# Patient Record
Sex: FEMALE | Race: WHITE | NOT HISPANIC OR LATINO | Employment: FULL TIME | URBAN - METROPOLITAN AREA
[De-identification: names, ages, dates, MRNs, and addresses within clinical notes are randomized per-mention and may not be internally consistent; named-entity substitution may affect disease eponyms.]

---

## 2020-03-05 ENCOUNTER — HOSPITAL ENCOUNTER (EMERGENCY)
Facility: HOSPITAL | Age: 25
Discharge: HOME | End: 2020-03-06
Attending: EMERGENCY MEDICINE

## 2020-03-05 DIAGNOSIS — R52 PAIN: ICD-10-CM

## 2020-03-05 DIAGNOSIS — S82.842A CLOSED BIMALLEOLAR FRACTURE OF LEFT ANKLE, INITIAL ENCOUNTER: Primary | ICD-10-CM

## 2020-03-05 PROCEDURE — 99284 EMERGENCY DEPT VISIT MOD MDM: CPT | Mod: 25

## 2020-03-05 PROCEDURE — 29515 APPLICATION SHORT LEG SPLINT: CPT | Mod: LT

## 2020-03-06 VITALS
HEART RATE: 92 BPM | OXYGEN SATURATION: 100 % | HEIGHT: 68 IN | DIASTOLIC BLOOD PRESSURE: 59 MMHG | RESPIRATION RATE: 16 BRPM | TEMPERATURE: 100 F | BODY MASS INDEX: 21.98 KG/M2 | SYSTOLIC BLOOD PRESSURE: 120 MMHG | WEIGHT: 145 LBS

## 2020-03-06 LAB
B-HCG UR QL: NEGATIVE
CTP QC/QA: YES

## 2020-03-06 PROCEDURE — 25000003 PHARM REV CODE 250: Performed by: EMERGENCY MEDICINE

## 2020-03-06 PROCEDURE — 81025 URINE PREGNANCY TEST: CPT | Performed by: EMERGENCY MEDICINE

## 2020-03-06 RX ORDER — NAPROXEN 500 MG/1
500 TABLET ORAL 2 TIMES DAILY WITH MEALS
Qty: 30 TABLET | Refills: 0 | Status: SHIPPED | OUTPATIENT
Start: 2020-03-06

## 2020-03-06 RX ORDER — HYDROCODONE BITARTRATE AND ACETAMINOPHEN 5; 325 MG/1; MG/1
1 TABLET ORAL
Status: DISCONTINUED | OUTPATIENT
Start: 2020-03-06 | End: 2020-03-06 | Stop reason: HOSPADM

## 2020-03-06 RX ORDER — HYDROCODONE BITARTRATE AND ACETAMINOPHEN 5; 325 MG/1; MG/1
1 TABLET ORAL EVERY 8 HOURS PRN
Qty: 12 TABLET | Refills: 0 | Status: SHIPPED | OUTPATIENT
Start: 2020-03-06

## 2020-03-06 RX ORDER — HYDROCODONE BITARTRATE AND ACETAMINOPHEN 5; 325 MG/1; MG/1
1 TABLET ORAL
Status: COMPLETED | OUTPATIENT
Start: 2020-03-06 | End: 2020-03-06

## 2020-03-06 RX ORDER — METHOCARBAMOL 500 MG/1
1000 TABLET, FILM COATED ORAL 3 TIMES DAILY
Qty: 30 TABLET | Refills: 0 | Status: SHIPPED | OUTPATIENT
Start: 2020-03-06 | End: 2020-03-11

## 2020-03-06 RX ORDER — MEDROXYPROGESTERONE ACETATE 150 MG/ML
150 INJECTION, SUSPENSION INTRAMUSCULAR
COMMUNITY

## 2020-03-06 RX ADMIN — HYDROCODONE BITARTRATE AND ACETAMINOPHEN 1 TABLET: 5; 325 TABLET ORAL at 12:03

## 2020-03-06 NOTE — ED NOTES
Assumed care:  Delfin Chambers is awake, alert and oriented x 3, skin warm and dry, in NAD.  Patient states that she was walking outside in 8 in heels and heel got stuck in ground and twisted her left ankle.  Left ankle swelling, pedal pulses 2+.    Patient identifiers for Deflin Chambers checked and correct.  LOC:  Delfin Chambers is awake, alert, and aware of environment with an appropriate affect. She is oriented x 3 and speaking appropriately.  APPEARANCE:  She is resting comfortably and in no acute distress. She is clean and well groomed, patient's clothing is properly fastened.  SKIN:  The skin is warm and dry. She has normal skin turgor and moist mucus membranes. Skin is intact; no bruising or breakdown noted.  MUSCULOSKELETAL:  She is moving all extremities well, no obvious deformities noted. Pulses intact.  Left ankle pain and swelling.  RESPIRATORY:  Airway is open and patent. Respirations are spontaneous and non-labored with normal effort and rate.  CARDIAC:  She has a normal rate and rhythm. No peripheral edema noted. Capillary refill < 3 seconds.  ABDOMEN:  No distention noted.  Soft and non-tender upon palpation.  NEUROLOGICAL:  PERRL. Facial expression is symmetrical. Hand grasps are equal bilaterally. Normal sensation in all extremities when touched with finger.  Allergies reported:    Review of patient's allergies indicates:   Allergen Reactions    Penicillins      OTHER NOTES:

## 2020-03-06 NOTE — ED NOTES
Posterior stir up splint placed per Dr. Willingham, crutches provided.  Patient educated on splint care and use of crutches.

## 2020-03-06 NOTE — ED PROVIDER NOTES
Encounter Date: 3/5/2020    SCRIBE #1 NOTE: IElizabeth, am scribing for, and in the presence of, Jeet Willingham MD.       History     Chief Complaint   Patient presents with    Ankle Injury       Time seen by provider: 11:56 PM on 03/05/2020    Delfin Chambers is a 25 y.o. female with no PMHx or SHx who presents to the ED with complaints of left ankle pain s/p fall that occurred immediately PTA. Patient states she was wearing heels that sunk into the grass causing her to twist her ankle. Patient denies previous injury, fracture, or surgery to the right ankle. She denies hitting her head or LOC during the fall. Patient denies recent fever, chills, cough, chest pain, SOB, numbness, weakness or other new symptoms.  She has no other medical concerns or complaints at this moment. Penicillin allergy noted noted.     The history is provided by the patient.     Review of patient's allergies indicates:   Allergen Reactions    Penicillins      History reviewed. No pertinent past medical history.  History reviewed. No pertinent surgical history.  History reviewed. No pertinent family history.  Social History     Tobacco Use    Smoking status: Never Smoker   Substance Use Topics    Alcohol use: Yes     Comment: daily    Drug use: Yes     Types: Marijuana     Comment: daily     Review of Systems   Constitutional: Negative for activity change, appetite change, chills, fatigue and fever.   HENT: Negative for facial swelling.    Respiratory: Negative for shortness of breath.    Cardiovascular: Negative for chest pain and palpitations.   Gastrointestinal: Negative for abdominal pain and vomiting.   Musculoskeletal: Positive for arthralgias. Negative for back pain and neck pain.   Skin: Negative for pallor and rash.   Neurological: Negative for syncope and headaches.   Hematological: Does not bruise/bleed easily.   Psychiatric/Behavioral: Negative for agitation.       Physical Exam     Initial Vitals [03/05/20 2357]   BP  Pulse Resp Temp SpO2   96/73 (!) 128 16 99.5 °F (37.5 °C) 99 %      MAP       --         Physical Exam    Nursing note and vitals reviewed.  Constitutional: She appears well-developed and well-nourished. She is not diaphoretic. No distress.   HENT:   Head: Normocephalic and atraumatic.   Mouth/Throat: Oropharynx is clear and moist and mucous membranes are normal.   Eyes: EOM are normal. Pupils are equal, round, and reactive to light.   Pupils are 3 mm round and reactive to light.    Neck: Neck supple. No tracheal deviation present.   Cardiovascular: Normal rate, regular rhythm and normal heart sounds. Exam reveals no gallop and no friction rub.    No murmur heard.  Pulses:       Radial pulses are 2+ on the right side, and 2+ on the left side.        Dorsalis pedis pulses are 2+ on the right side, and 2+ on the left side.   Pulmonary/Chest: Breath sounds normal. She has no wheezes. She has no rhonchi. She has no rales.   Abdominal: Soft. Bowel sounds are normal. She exhibits no distension. There is no hepatosplenomegaly. There is no tenderness. There is no rebound, no guarding and no CVA tenderness.   No palpable organomegaly.    Musculoskeletal:        Left ankle: She exhibits swelling. She exhibits normal range of motion. Tenderness. Lateral malleolus and medial malleolus tenderness found.   Swelling to the right ankle. Tenderness to lateral and medial malleolus of right ankle. Ankle in proper and correct anatomical position. Full ROM of distal extremities, knee, and ankle.    Lymphadenopathy:     She has no cervical adenopathy.   Neurological: She is alert and oriented to person, place, and time. She has normal strength. No cranial nerve deficit or sensory deficit.   Skin: Skin is warm and dry.   Capillary refill is less than 2 seconds.          ED Course   Orthopedic Injury  Date/Time: 3/6/2020 12:34 AM  Performed by: Jeet Willingham MD  Authorized by: Jeet Willingham MD     Consent Done?:  Not Needed  Injury:      Injury location:  Ankle    Location details:  Left ankle    Injury type:  Fracture    Fracture type: bimalleolar        Pre-procedure assessment:     Neurovascular status: Neurovascularly intact      Distal perfusion: normal      Neurological function: normal      Range of motion: normal      Local anesthesia used?: No      Patient sedated?: No        Selections made in this section will also lock the Injury type section above.:     Splint type:  Ankle stirrup    Supplies used:  Ortho-Glass  Post-procedure assessment:     Patient tolerance:  Patient tolerated the procedure well with no immediate complications      Labs Reviewed   POCT URINE PREGNANCY          Imaging Results          X-Ray Ankle Complete Left (In process)  Result time 03/06/20 00:02:00                 Medical Decision Making:   History:   Old Medical Records: I decided to obtain old medical records.  Initial Assessment:   This is an emergent evaluation of an 25 y.o. presenting with left ankle pain.  Differential Diagnosis:   Initial differential diagnosis includes but is not limited to: fracture, soft tissue injury, and sprain.  Clinical Tests:   Lab Tests: Reviewed and Ordered  Radiological Study: Reviewed and Ordered            Scribe Attestation:   Scribe #1: I performed the above scribed service and the documentation accurately describes the services I performed. I attest to the accuracy of the note.    Attending Attestation:             Attending ED Notes:   Patient's x-rays show fracture of the distal tibia and fibula no sign of dislocation, patient is pulses 2+ patient placed in ortho glass splint, patient neurovascularly intact after splinting patient is to be nonweightbearing I will give patient course of pain medication and refer to orthopedics for further evaluation and management patient is cautioned to return immediately to the emergency department for any worsening or any further concerns     I, Dr. Jeet Willingham, personally performed  the services described in this documentation. All medical record entries made by the scribe were at my direction and in my presence.  I have reviewed the chart and agree that the record reflects my personal performance and is accurate and complete. Jeet Mckee MD.                      Clinical Impression:       ICD-10-CM ICD-9-CM   1. Closed bimalleolar fracture of left ankle, initial encounter S82.842A 824.4   2. Pain R52 780.96             ED Disposition Condition    Discharge Stable        ED Prescriptions     Medication Sig Dispense Start Date End Date Auth. Provider    HYDROcodone-acetaminophen (NORCO) 5-325 mg per tablet Take 1 tablet by mouth every 8 (eight) hours as needed for Pain. 12 tablet 3/6/2020  Jeet Willingham MD    methocarbamol (ROBAXIN) 500 MG Tab Take 2 tablets (1,000 mg total) by mouth 3 (three) times daily. for 5 days 30 tablet 3/6/2020 3/11/2020 Jeet Willingham MD    naproxen (NAPROSYN) 500 MG tablet Take 1 tablet (500 mg total) by mouth 2 (two) times daily with meals. 30 tablet 3/6/2020  Jeet Willingham MD        Follow-up Information     Follow up With Specialties Details Why Contact Info    [facility] Emergency Medicine  If symptoms worsen [address] [place] [id number] [phone]    Emir Zhong MD Orthopedic Surgery Schedule an appointment as soon as possible for a visit in 2 days  [address] [place] [address]  [address]  [facility]  [facility] [address]  [phone]                                       Jeet Willingham MD  03/06/20 0114